# Patient Record
Sex: FEMALE | Race: WHITE | NOT HISPANIC OR LATINO | URBAN - METROPOLITAN AREA
[De-identification: names, ages, dates, MRNs, and addresses within clinical notes are randomized per-mention and may not be internally consistent; named-entity substitution may affect disease eponyms.]

---

## 2017-07-18 ENCOUNTER — APPOINTMENT (RX ONLY)
Dept: URBAN - METROPOLITAN AREA CLINIC 73 | Facility: CLINIC | Age: 57
Setting detail: DERMATOLOGY
End: 2017-07-18

## 2017-07-18 DIAGNOSIS — L20.89 OTHER ATOPIC DERMATITIS: ICD-10-CM

## 2017-07-18 PROBLEM — J30.1 ALLERGIC RHINITIS DUE TO POLLEN: Status: ACTIVE | Noted: 2017-07-18

## 2017-07-18 PROBLEM — L85.3 XEROSIS CUTIS: Status: ACTIVE | Noted: 2017-07-18

## 2017-07-18 PROBLEM — L20.84 INTRINSIC (ALLERGIC) ECZEMA: Status: ACTIVE | Noted: 2017-07-18

## 2017-07-18 PROCEDURE — 99213 OFFICE O/P EST LOW 20 MIN: CPT

## 2017-07-18 PROCEDURE — ? TREATMENT REGIMEN

## 2017-07-18 PROCEDURE — ? COUNSELING

## 2017-07-18 PROCEDURE — ? PRESCRIPTION

## 2017-07-18 RX ORDER — FLUOCINONIDE 1 MG/G
CREAM TOPICAL
Qty: 1 | Refills: 0 | Status: ERX | COMMUNITY
Start: 2017-07-18

## 2017-07-18 RX ADMIN — FLUOCINONIDE: 1 CREAM TOPICAL at 12:57

## 2017-07-18 NOTE — PROCEDURE: TREATMENT REGIMEN
Samples Given: Recommend pt to apply White petroleum while she waits for Rx.
Detail Level: Zone
Initiate Treatment: Fluocinonide cr 0.1% BID x5-7days

## 2017-07-20 ENCOUNTER — RX ONLY (OUTPATIENT)
Age: 57
Setting detail: RX ONLY
End: 2017-07-20

## 2017-07-20 RX ORDER — FLUOCINONIDE 0.5 MG/ML
CREAM TOPICAL
Qty: 1 | Refills: 0 | Status: ERX | COMMUNITY
Start: 2017-07-20

## 2018-02-15 ENCOUNTER — APPOINTMENT (RX ONLY)
Dept: URBAN - METROPOLITAN AREA CLINIC 73 | Facility: CLINIC | Age: 58
Setting detail: DERMATOLOGY
End: 2018-02-15

## 2018-02-15 DIAGNOSIS — L30.9 DERMATITIS, UNSPECIFIED: ICD-10-CM

## 2018-02-15 PROCEDURE — ? OBSERVATION AND MEASURE

## 2018-02-15 PROCEDURE — ? PRESCRIPTION

## 2018-02-15 PROCEDURE — ? TREATMENT REGIMEN

## 2018-02-15 PROCEDURE — 99213 OFFICE O/P EST LOW 20 MIN: CPT

## 2018-02-15 PROCEDURE — ? COUNSELING

## 2018-02-15 RX ORDER — FLUOCINONIDE 1 MG/G
CREAM TOPICAL
Qty: 1 | Refills: 0 | Status: ERX

## 2018-02-15 RX ORDER — PIMECROLIMUS 10 MG/G
CREAM TOPICAL
Qty: 1 | Refills: 1 | Status: ERX | COMMUNITY
Start: 2018-02-15

## 2018-02-15 RX ADMIN — PIMECROLIMUS: 10 CREAM TOPICAL at 20:04

## 2018-02-15 ASSESSMENT — LOCATION DETAILED DESCRIPTION DERM
LOCATION DETAILED: RIGHT LATERAL PROXIMAL PRETIBIAL REGION
LOCATION DETAILED: LEFT PROXIMAL PRETIBIAL REGION

## 2018-02-15 ASSESSMENT — LOCATION SIMPLE DESCRIPTION DERM
LOCATION SIMPLE: LEFT PRETIBIAL REGION
LOCATION SIMPLE: RIGHT PRETIBIAL REGION

## 2018-02-15 ASSESSMENT — PAIN INTENSITY VAS: HOW INTENSE IS YOUR PAIN 0 BEING NO PAIN, 10 BEING THE MOST SEVERE PAIN POSSIBLE?: NO PAIN

## 2018-02-15 ASSESSMENT — LOCATION ZONE DERM: LOCATION ZONE: LEG

## 2018-02-15 NOTE — HPI: RASH
How Severe Is Your Rash?: moderate
Is This A New Presentation, Or A Follow-Up?: Rash
Additional History: Patient thinks it's related to her vitiligo

## 2018-02-15 NOTE — PROCEDURE: MIPS QUALITY
Quality 131: Pain Assessment And Follow-Up: Pain assessment documented as positive using a standardized tool AND a follow-up plan is documented
Quality 110: Preventive Care And Screening: Influenza Immunization: Influenza Immunization Administered during Influenza season
Quality 130: Documentation Of Current Medications In The Medical Record: Current Medications Documented
Detail Level: Detailed

## 2018-07-17 ENCOUNTER — APPOINTMENT (RX ONLY)
Dept: URBAN - METROPOLITAN AREA CLINIC 73 | Facility: CLINIC | Age: 58
Setting detail: DERMATOLOGY
End: 2018-07-17

## 2018-07-17 DIAGNOSIS — L20.89 OTHER ATOPIC DERMATITIS: ICD-10-CM

## 2018-07-17 PROBLEM — L30.9 DERMATITIS, UNSPECIFIED: Status: ACTIVE | Noted: 2018-07-17

## 2018-07-17 PROCEDURE — ? OTHER

## 2018-07-17 PROCEDURE — ? TREATMENT REGIMEN

## 2018-07-17 PROCEDURE — 99213 OFFICE O/P EST LOW 20 MIN: CPT

## 2018-07-17 PROCEDURE — ? PRESCRIPTION

## 2018-07-17 PROCEDURE — ? COUNSELING

## 2018-07-17 RX ORDER — FLUOCINONIDE 0.5 MG/ML
OINTMENT TOPICAL
Qty: 1 | Refills: 0 | Status: ERX | COMMUNITY
Start: 2018-07-17

## 2018-07-17 RX ADMIN — FLUOCINONIDE: 0.5 OINTMENT TOPICAL at 21:07

## 2018-07-17 ASSESSMENT — LOCATION ZONE DERM
LOCATION ZONE: HAND
LOCATION ZONE: FACE
LOCATION ZONE: NECK
LOCATION ZONE: TRUNK
LOCATION ZONE: ARM

## 2018-07-17 ASSESSMENT — LOCATION SIMPLE DESCRIPTION DERM
LOCATION SIMPLE: LEFT HAND
LOCATION SIMPLE: RIGHT UPPER ARM
LOCATION SIMPLE: RIGHT HAND
LOCATION SIMPLE: LEFT ANTERIOR NECK
LOCATION SIMPLE: LEFT UPPER ARM
LOCATION SIMPLE: CHEST
LOCATION SIMPLE: RIGHT CHEEK

## 2018-07-17 ASSESSMENT — LOCATION DETAILED DESCRIPTION DERM
LOCATION DETAILED: LEFT SUPERIOR ANTERIOR NECK
LOCATION DETAILED: UPPER STERNUM
LOCATION DETAILED: RIGHT HYPOTHENAR EMINENCE
LOCATION DETAILED: RIGHT ANTERIOR DISTAL UPPER ARM
LOCATION DETAILED: RIGHT INFERIOR CENTRAL MALAR CHEEK
LOCATION DETAILED: LEFT ANTERIOR DISTAL UPPER ARM
LOCATION DETAILED: LEFT HYPOTHENAR EMINENCE

## 2018-07-17 ASSESSMENT — SEVERITY ASSESSMENT: SEVERITY: MILD TO MODERATE

## 2018-07-17 NOTE — HPI: RASH
How Severe Is Your Rash?: moderate
Is This A New Presentation, Or A Follow-Up?: Rash
Additional History: Patient has brought a list of products she has been using. Patient states she has been taking allergy shots every month for season allergies. Patient wants to know if her daily moisturizer called noel tolliver is safe to use. Patient reports only using the daily moisturizer rarely. Patient uses CeraVe hydrating cleanser, CeraVe daily lotion, Neutrogena healthy defense w/ spf 50, lubriderm lotion, and fluocinonide .1% cream. Patient states fluocinonide .1 cream has been helping sometimes.

## 2018-07-17 NOTE — PROCEDURE: OTHER
Detail Level: Zone
Other (Free Text): -Possible Eczema vs. Contact dermatitis on neck, chest and arms; rash noted in rinse like pattern \\n-Possible contact allergy to fragrances \\n-Pt reports possible h/o childhood eczema \\n-Pt reports recent change of personal care products; conditioner and shampoo \\n-Pt reports recent stress\\n-Will start fluocinonide oint x2 weeks \\n-Recommended pt start soak and smear technique on hands with fluocinonide at bedtime\\n-D/w pt may consider [place] standard patch testing in near future if flares not controlled with fluocinonide \\n-Recommended dove unscented bar soap and CeraVe products, unscented products\\n-Will rtc in 3-4 weeks for f/u
Note Text (......Xxx Chief Complaint.): This diagnosis correlates with the

## 2018-07-17 NOTE — PROCEDURE: TREATMENT REGIMEN
Detail Level: Zone
Otc Regimen: Dove unscented bar soap \\nCeraVe or Cetaphil cream \\nVaniCream
Initiate Treatment: Fluocinonide 0.05% ointment Soak hands in warm water at night for 10-15 minutes. Then apply fluocinonide ointment and cover with cotton gloves. Do this regimen for 1-2 weeks. Apply to affected areas on body twice daily x 2 weeks.
Samples Given: Dove unscented bar soap \\nCeraVe hydrating cleanser \\nCetaphil moisturizing cream \\nCetaphil gentle cleanser

## 2023-12-07 ENCOUNTER — APPOINTMENT (RX ONLY)
Dept: URBAN - METROPOLITAN AREA CLINIC 126 | Facility: CLINIC | Age: 63
Setting detail: DERMATOLOGY
End: 2023-12-07

## 2023-12-07 DIAGNOSIS — D69.2 OTHER NONTHROMBOCYTOPENIC PURPURA: ICD-10-CM

## 2023-12-07 DIAGNOSIS — B35.1 TINEA UNGUIUM: ICD-10-CM | Status: INADEQUATELY CONTROLLED

## 2023-12-07 PROCEDURE — ? PRESCRIPTION MEDICATION MANAGEMENT

## 2023-12-07 PROCEDURE — ? PRESCRIPTION

## 2023-12-07 PROCEDURE — 99214 OFFICE O/P EST MOD 30 MIN: CPT

## 2023-12-07 PROCEDURE — ? COUNSELING

## 2023-12-07 RX ORDER — TAVABOROLE 43.5 MG/ML
SOLUTION TOPICAL QHS
Qty: 4 | Refills: 3 | Status: ERX | COMMUNITY
Start: 2023-12-07

## 2023-12-07 RX ADMIN — TAVABOROLE: 43.5 SOLUTION TOPICAL at 00:00

## 2023-12-07 ASSESSMENT — LOCATION DETAILED DESCRIPTION DERM: LOCATION DETAILED: LEFT GREAT TOENAIL

## 2023-12-07 ASSESSMENT — NAIL INVOLVEMENT PERCENT: % OF NAIL(S) INVOLVED WITH INFECTION: 1

## 2023-12-07 ASSESSMENT — LOCATION ZONE DERM: LOCATION ZONE: TOENAIL

## 2023-12-07 ASSESSMENT — LOCATION SIMPLE DESCRIPTION DERM: LOCATION SIMPLE: LEFT GREAT TOE

## 2023-12-07 NOTE — HPI: NAIL DISORDER
Is This A New Presentation, Or A Follow-Up?: Nail Disorder
How Severe Is It?: mild
Additional History: Two months ago had gel nails placed on toenails, once they came off noticed a dark spot.

## 2023-12-07 NOTE — PROCEDURE: PRESCRIPTION MEDICATION MANAGEMENT
Initiate Treatment: Kerydin 5% solution nightly to toenail.
Render In Strict Bullet Format?: No
Detail Level: Detailed